# Patient Record
Sex: MALE | Race: WHITE | NOT HISPANIC OR LATINO | Employment: FULL TIME | URBAN - METROPOLITAN AREA
[De-identification: names, ages, dates, MRNs, and addresses within clinical notes are randomized per-mention and may not be internally consistent; named-entity substitution may affect disease eponyms.]

---

## 2020-09-08 ENCOUNTER — OFFICE VISIT (OUTPATIENT)
Dept: OBGYN CLINIC | Facility: CLINIC | Age: 30
End: 2020-09-08
Payer: OTHER MISCELLANEOUS

## 2020-09-08 VITALS — HEART RATE: 86 BPM | HEIGHT: 73 IN | DIASTOLIC BLOOD PRESSURE: 99 MMHG | SYSTOLIC BLOOD PRESSURE: 152 MMHG

## 2020-09-08 DIAGNOSIS — M76.71 PERONEAL TENDONITIS OF RIGHT LOWER EXTREMITY: ICD-10-CM

## 2020-09-08 DIAGNOSIS — T14.8XXA CONTUSION OF BONE: ICD-10-CM

## 2020-09-08 DIAGNOSIS — S93.491A SPRAIN OF ANTERIOR TALOFIBULAR LIGAMENT OF RIGHT ANKLE, INITIAL ENCOUNTER: Primary | ICD-10-CM

## 2020-09-08 PROCEDURE — 99203 OFFICE O/P NEW LOW 30 MIN: CPT | Performed by: ORTHOPAEDIC SURGERY

## 2020-09-08 NOTE — PATIENT INSTRUCTIONS
You have sprained your ankle  Over the next 4 weeks it is important you follow these instructions; Lateral Ankle Sprain Protocol - [facility]  Acute Phase: Days 1-3  Goals: Decrease pain and swelling, protect from further injury  · Pain and swelling management (RICE)  · Protection of injured ligaments (taping, splints, casting, walking boot etc )  · Gait-WBAT  Sub-Acute Phase: 2-4 days to 2 weeks  Goals: Decrease/eliminate pain, increase ROM, decrease swelling, increase strength  · Continue pain and swelling management  · Subtalar and talocrurcal joint mobilizations  · ROM with pain-free range: DF/PF/EV/IV AROM, calf stretching  · Increase weight bearing of affected extremity during gait  · Isometric strengthening  Rehabilitative Phase: 2-6 weeks  Goals: Regain ROM and strength, increase endurance and proprioception  · Continue joint mobilizations and stretching  · Progress to pain-free concentric and eccentric strengthening exercises (both open chain and closed chain)  · Proprioception exercises (balance board, BAPS board, single leg stance etc )  · Gait training-promote equal weight beraing and weaning of assistive devices  · Endurance activities (stationary biking, swimming, walking, etc )  Functional Phase: 6 weeks  Goals: Return to full activity and function  · Continue strengthening exercises  · Coordination and agility training-depends on patient's prior level of function, recreational activities, and goals         Treating your Sprained Ankle  Treating your sprained ankle properly may prevent chronic pain and instability  For a Grade I sprain, follow the R I C E  guidelines:    · Rest your ankle by not walking on it  Limit weight bearing  Use crutches if necessary; if there is no fracture you are safe to put some weight on the leg  An ankle brace often helps control swelling and adds stability while the ligaments are healing  · Ice it to keep down the swelling   Don't put ice directly on the skin (use a thin piece of cloth such as a pillow case between the ice bag and the skin) and don't ice more than 20 minutes at a time to avoid frost bite  · Compression can help control swelling as well as immobilize and support your injury  · Elevate the foot by reclining and propping it up above the waist or heart as needed     Swelling usually goes down with a few days  For a Grade II sprain, follow the R I C E  guidelines and allow more time for healing  A doctor may immobilize or splint your sprained ankle  A Grade III sprain puts you at risk for permanent ankle instability  Rarely, surgery may be needed to repair the damage, especially in competitive athletes  For severe ankle sprains, your doctor may also consider treating you with a short leg cast for two to three weeks or a walking boot  People who sprain their ankle repeatedly may also need surgical repair to tighten their ligaments  Rehabilitating your Sprained Ankle  Every ligament injury needs rehabilitation  Otherwise, your sprained ankle might not heal completely and you might re-injure it  All ankle sprains, from mild to severe, require three phases of recovery:    · Phase I includes resting, protecting and reducing swelling of your injured ankle  · Phase II includes restoring your ankle's flexibility, range of motion and strength  · Phase III includes gradually returning to straight-ahead activity and doing maintenance exercises, followed later by more cutting sports such as tennis, basketball or football    Once you can stand on your ankle again, your doctor will prescribe exercise routines to strengthen your muscles and ligaments and increase your flexibility, balance and coordination  Later, you may walk, jog and run figure eights with your ankle taped or in a supportive ankle brace  It's important to complete the rehabilitation program because it makes it less likely that you'll hurt the same ankle again   If you don't complete rehabilitation, you could suffer chronic pain, instability and arthritis in your ankle  If your ankle still hurts, it could mean that the sprained ligament has not healed right, or that some other injury also happened  To prevent future sprained ankles, pay attention to your body's warning signs to slow down when you feel pain or fatigue, and stay in shape with good muscle balance, flexibility and strength in your soft tissues  Ankle Sprain     What is an ankle sprain? An ankle sprain refers to tearing of the ligaments of the ankle  The most common ankle sprain occurs on the lateral or outside part of the ankle  This is an extremely common injury which affects many people during a wide variety of activities  It can happen in the setting of an ankle fracture (i e  when the bones of the ankle also break)  Most commonly, however, it occurs in isolation  What are the symptoms an ankle sprain? Patients report pain after having twisted an ankle  This usually occurs due to an inversion injury, which means the foot rolls underneath the ankle or leg  It commonly occurs during sports  Patients will complain of pain on the outside of their ankle and various degrees of swelling and bleeding under the skin (i e  bruising)  Technically, this bruising is referred to as ecchymosis  Depending on the severity of the sprain, a person may or may not be able to put weight on the foot     What are the risk factors for an ankle sprain? As noted above, these injuries occur when the ankle is twisted underneath the leg, called inversion  Risk factors are those activities, such as basketball and jumping sports, in which an athlete can come down on and turn the ankle or step on an opponent's foot      Some people are predisposed to ankle sprains  In people with a hindfoot varus, which means that the general nature or posture of the heels is slightly turned toward the inside, these injuries are more common   This is because it is easier to turn on the ankle      In those who have had a severe sprain in the past, it is also easier to turn the ankle and cause a new sprain  Therefore, one of the risk factors of spraining the ankle is having instability  Those who have weak muscles, especially those called the peroneals which run along the outside of the ankle, may be more predisposed    Anatomy    There are multiple ligaments in the ankle  Ligaments in general are those structures that connect bone-to-bone  Tendons, on the other hand, connect muscle-to-bone and allow those muscles to exert their force  In the case of an ankle sprain, there are several commonly sprained ligaments  The two most important are the following:            ?  ????       1  The ATFL or anterior talofibular ligament, which connects the talus to the fibula on the outside of the ankle       2  The CFL or calcaneal fibular ligament, which connects the fibula to the calcaneus below        3  Finally, there is a third ligament which is not as commonly torn  It runs more in the back of the ankle and is called the PTFL or posterior talofibular ligament  These must be differentiated from the so-called high ankle sprain ligaments, which are completely different and located higher up the leg  How is an ankle sprain diagnosed? Ankle sprains can be diagnosed fairly easily given that they are common injuries  The location of pain on the outside of the ankle with tenderness and swelling in a patient who has an ankle with inversion is very suggestive  In these patients, normal X-rays also suggest that the bone has not been broken and instead the ankle ligaments have been torn or sprained      It is very important, however, not to simply regard any injury as an ankle sprain because other injuries can occur as well  For example, the peroneal tendons mentioned above can be torn   There can also be fractures in other bones around the ankle including the fifth metatarsal and the anterior process of the calcaneus  In very severe cases, an MRI may be warranted to rule out other problems in the ankle such as damage to the cartilage  An MRI typically is not necessary to diagnose a sprain     What are treatment options? Surgery is not required in the vast majority of ankle sprains  Even in severe sprains, these ligaments will heal without surgery  The grade of the sprain will dictate treatment  Sprains are traditionally classified into several grades  Perhaps more important, however, is the patients ability to bear weight  Those that can bear weight even after the injury are likely to return very quickly to play  Those who cannot walk may need to be immobilized      In general, treatment in the first 48 to 72 hours consists of resting the ankle, icing 20 minutes every two to three hours, compressing with an ACE wrap, and elevating, which means positioning the leg and ankle so that the toes are above the level of patients nose  Those patients who cannot bear weight are better treated in a removable walking boot until they can comfortably bear weight      Physical therapy is a mainstay  Patients should learn to strengthen the muscles around the ankle, particularly the peroneals  An ankle brace can be used in an athlete until a therapist believes that the ankle is strong enough to return to play without it  Surgery is rarely indicated but may be needed in a patient who has cartilage damage or other related injuries  Ligaments are only repaired or strengthened in cases of chronic instability in which the ligaments have healed but not in a strong fashion  How long is recovery? Recovery depends on the severity of the injury  As noted above, for those minor injuries, people can return to their activities in sports within several days  For very severe sprains, it may take longer and up to several weeks   It should be noted that high ankle sprains take considerably longer to heal      Outcomes for ankle sprains are generally quite good  Most patients heal from an ankle sprain and are able to get back to their normal lives, sports and activities  Some people, however, who do not properly rehab their ankle and have a rather severe sprain may go on to have ankle instability  Chronic instability occurs in patients repeatedly spraining the ankle  Such repeated episodes can be dangerous because they can lead to damage within the ankle  These patients should be identified and considered for repair     Potential Complications    Surgery is rarely needed  As noted above, however, an improperly rehabbed ankle may end up having chronic instability  It is important to address this with either therapy or surgery before further damage occurs to the ankle  Frequently Asked Questions    What is a high ankle sprain and is that different from a regular ankle sprain? A high ankle sprain refers to tearing of the ligaments that connect the tibia to the fibula (this connection is also called the syndesmosis)  These are different and much less common than the standard lateral ankle sprains, meaning those that occur on the side of the ankle       Do ankle sprains ever need to be repaired acutely? Ankle sprains rarely, if ever, needed to be treated with surgery  The vast majority simply need to be treated with rest, ice, compression and elevation followed by physical therapy and temporary bracing       I have sprained my ankle many times  Should I be concerned? Yes  The more you sprain an ankle, the greater the chance that problems will develop  For example, turning the ankle can lead to damage to the cartilage inside the ankle joint  You should see your doctor if this is occurring  How to Stretch Your Ankle After A Sprain     You should perform the following stretches in stages once the initial pain and swelling have receded, usually within five to seven days   First is restoration of ankle range of motion, which should begin when you can tolerate weight bearing  Once ankle range of motion has been almost or completely restored, you must strengthen your ankle  Along with strengthening, you should work toward a feeling of stability and comfort in your ankle, which orthopaedic foot and ankle specialists call proprioception  Consider these home exercises when recuperating from an ankle sprain  Perform them twice per day       While seated, bring your ankle and foot all the way up as much as you can     Do this slowly, while feeling a stretch in your calf     Hold this for a count of 10     Repeat 10 times               From the seated starting position, bring your ankle down and in    [facility] this inverted position for a count of 10     Repeat 10 times                     Again from the starting position, bring your ankle up and out    [facility] this everted position for a count of 10     Repeat 10 times                    [place] the starting position, point your toes down and hold this position for a count of 10     Repeat 10 times                 This stretch should be done only when the pain in your ankle has significantly subsided     While standing on the edge of a stair, drop your ankles down and hold this stretched position   for a count of 10     Repeat 10 times                       Do this stretch only when the pain from your ankle sprain has significantly subsided     Stand 12 inches from a wall with your toes pointing toward the wall     Squat down and hold this position for a count of 10     Repeat 10 times                 How to Strengthen Your Ankle After a Sprain     Following an ankle sprain, strengthening exercises should be performed once you can bear weight comfortably and your range of motion is near full  There are several types of strengthening exercises  The easiest to begin with are isometric exercises that you do by pushing against a fixed object with your ankle    Once this has been mastered, you can progress to isotonic exercises, which involve using your ankle's range of motion against some form of resistance  The photos below show isotonic exercises performed with a resistance band, which you can get from your local therapist or a sporting [employer] store       Place your ankle in the "down and in" position against a fixed object such as a couch    Hold this position for a count of 10     Repeat 10 times                       Place your ankle in the "up and out" position against the same object     Hold this position for a count of 10     Repeat 10 times                         Push your ankle down against a fixed object and hold for a count of 10  Repeat 10 times          Push your ankle up against a fixed object and hold for a count of 10  Repeat 10 times           Using a resistance band around your forefoot, hold the ends of the band with your hand    and gently push your ankle down as far as you can and then back to the starting position     Repeat 10 times                       Tie the resistance bands around a fixed object and wrap the ends around your forefoot     Start with your foot pointing down and pull your ankle up as far as you can     Return to the starting position and cycle your ankle 10 times                       Tie the bands around an object to the outer side of your ankle     Start with the foot relaxed and then move your ankle down and in     Return to the relaxed position and repeat 10 times                                Tie the ends of the bands around an object to the inside of your ankle and hold your foot relaxed     Bring your foot up and out and then back to the resting position     Repeat 10 times                          Once you have regained the motion and strength in your ankle, you are ready for sporting activities such as gentle jogging and biking  After you feel your ankle strength is approximately 80% of your other side, then you can begin cutting or twisting sports  Proprioceptive Exercises for Balance, Coordination and Agility      Stand with your affected leg on a pillow     Hold this position for a count of 10     Repeat 10 times                            Stand on your affected leg with the resistance band applied to your unaffected leg     Bring your unaffected leg forward and then back to the starting position     Repeat 10 times     Start slowly and progress to a faster speed for a more difficult workout                       Again, start slowly and increase your speed at your own pace, moving the unaffected leg   forward and then back to the starting position                                   For a more advanced exercise, swing your unaffected leg behind you and then back

## 2020-09-08 NOTE — LETTER
September 8, 2020     Referral [address] [place] [address]    Patient: Angela Morse   YOB: 1990   Date of Visit: 9/8/2020     Dear Dr Darryle Kicks      Thank you for referring Angela Morse to me for evaluation  Below are the relevant portions of my assessment and plan of care  If you have questions, please do not hesitate to call me  I look forward to following Sina Armas along with you           Sincerely,        Manuel Sinclair MD        CC: Per Leon MD    Progress Notes:

## 2020-09-08 NOTE — PROGRESS NOTES
NICO Finch  Attending, Orthopaedic Surgery  [facility]      Assessment:     Encounter Diagnoses   Name Primary?  Sprain of anterior talofibular ligament of right ankle, initial encounter Yes    Contusion of bone     Peroneal tendonitis of right lower extremity               Plan:       Peewee Carolina has been treated for a right ankle sprain for 11 weeks with HEP, high tide cam walker boot, and work restrictions  His previous imaging and reports Xray and MRI show no fracture or dislocation, no tendon tears, and no OCD lesions  He presents in the office today with continuous right ankle pain  Most of his pain is at his lateral ligaments and peroneal tendons  He is to discontinue cam walker boot immediately and wear a stiff soled sneaker as advised in the office, start physical therapy immediately  These two things have been shown in multiple level 1 studies to minimize the duration of symptoms (weightbearing as tolerated in a sneaker and PT as soon as possible ) He has no done either of these to this point  The patient has sustained a sprain of the right ATFL and CFL and peroneal tendinitis  We will begin physical therapy now- Order placed  Instructions for Home stretching program provided in AVSS  Instructions given for rest, ice 20mins/hr, elevation, and Ace wrap given for compression  Work/School restrictions given      Follow up will be in 7 weeks  Subjective:    Chief Complaint:    Chief Complaint   Patient presents with    Right Ankle - Pain        Missy Burgos is a 35 y o  male referred by workman's compensation Dr Leonard Sawant for evaluation and treatment of an injury to the right ankle  This is evaluated as a workers compensation injury  The injury occurred 11 weeks ago, and occurred 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[facility] [employer] and stepped into a divot in the road  The patient states the ankle rolled inward at the time of injury    He did hear or sense a pop or snap at the time of the injury  The patient notes pain and severe swelling of the ankle since the injury  He has treated the ankle with ice, ace wrap, Elevation, Rest, CAM boot, Xray  Pain is localized to the lateral, anteromedial  malleolar area  He has sprained this ankle in the past     Pain/symptom location: right ankle  Pain/symptom quality: aching and sharp  Pain/symptom severity: severe  Pain/symptom timing:  Worse during the day when active  Pain/symptom conext:  Worse with activites and work  Pain/symptom modifying factors:  Rest makes better, activities make worse  Pain/symptom associated signs/symptoms: none    Outside reports reviewed: x-ray reports and images, MRI report and images  Foot and Ankle Surgical History:   see below  Past Medical, Surgical and Social History:  Past Medical History:  has a past medical history of Hypertension  Problem List: does not have any pertinent problems on file  Past Surgical History:  has a past surgical history that includes Hernia repair  Family History: family history is not on file  Social History:  reports that he has been smoking  He has never used smokeless tobacco  No history on file for alcohol and drug  Current Medications: currently has no medications in their medication list   Allergies: is allergic to sulfa antibiotics       Review of Systems:  General- denies fever/chills  HEENT- denies hearing loss or sore throat  Eyes- denies eye pain or visual disturbances, denies red eyes  Respiratory- denies cough or SOB  Cardio- denies chest pain or palpitations  GI- denies abdominal pain  Endocrine- denies urinary frequency  Urinary- denies pain with urination  Musculoskeletal- Negative except noted above  Skin- denies rashes or wounds  Neurological- denies dizziness or headache  Psychiatric- denies anxiety or difficulty concentrating    Objective:        /99 (BP Location: Right arm, Patient Position: Sitting, Cuff Size: Adult)   Pulse 86   Ht 6' 1" (1 854 m)   General/Constitutional: No apparent distress: well-nourished and well developed  Eyes: normal ocular motion  Lymphatic: No appreciable lymphadenopathy  Respiratory: Non-labored breathing  Vascular: No edema, swelling or tenderness, except as noted in detailed exam   Integumentary: No impressive skin lesions present, except as noted in detailed exam   Neuro: No ataxia or abnormal movements  Psych: Normal mood and affect, oriented to person, place and time  MSK: normal other than stated in HPI and exam      Gait:  Antalgic (in a high tide cam walker boot) The patient can bear weight on the injured extremity  Right Ankle  Proximal Fibula:   no tenderness noted   Edema: Moderate swelling circumferentially in the ankle   Ecchymosis:   Moderate in lateral ankle   Crepitus  None   Active ROM:  50% of normal    Passive ROM:   75% of normal    Palpation:  Significant tenderness of the anterolateral ligaments, peroneal tendon   Stability :   No joint laxity  Drawer sign equal to unaffected ankle  Anterior drawer:  Negative , pain with anterior drawer  Syndosmosis:   syndesmotic ligament IS NOT tender   Sensation:     Intact in all distributions   Pulses:  normal DP and PT pulses       Imaging  X-ray of the ankle/foot: 3 views of the ankle reveal a stable mortise joint, moderate soft tissue swelling, and no evidence of fracture  Reviewed by me personally  MRI Right Ankle: no fracture or dislocation, no tendon tear noted  No evidence of an OCD lesion  I personally performed this service        Scribe Attestation    I,:   Gwendolyn Cornelius am acting as a scribe while in the presence of the attending physician :        I,:   Aurelia Shearer MD personally performed the services described in this documentation    as scribed in my presence :

## 2020-09-08 NOTE — LETTER
September 8, 2020     Patient: Katelyn Velasquez   YOB: 1990   Date of Visit: 9/8/2020       To Whom it May Concern:    Katelyn Velasquez is under my professional care  He was seen in my office on 9/8/2020  He is to be on sedentary desk duty only until his next office visit x 7 weeks  If you have any questions or concerns, please don't hesitate to call           Sincerely,          Albert Koo MD        CC: Katelyn Eva

## 2020-09-09 ENCOUNTER — TELEPHONE (OUTPATIENT)
Dept: OBGYN CLINIC | Facility: HOSPITAL | Age: 30
End: 2020-09-09

## 2020-09-09 NOTE — TELEPHONE ENCOUNTER
Parker Bro from [facility] is calling because he does not have the patient's phone number  Parker Bro is also asking that I fax the patient's physical therapy script to him  Information was faxed to [phone]

## 2020-09-17 NOTE — TELEPHONE ENCOUNTER
Hannah at [address] [employer] is calling in wanting to get the PT script faxed over to [phone] they did not receive this

## 2020-11-13 ENCOUNTER — OFFICE VISIT (OUTPATIENT)
Dept: OBGYN CLINIC | Facility: CLINIC | Age: 30
End: 2020-11-13
Payer: OTHER MISCELLANEOUS

## 2020-11-13 VITALS
WEIGHT: 282 LBS | BODY MASS INDEX: 36.19 KG/M2 | HEIGHT: 74 IN | HEART RATE: 85 BPM | SYSTOLIC BLOOD PRESSURE: 148 MMHG | DIASTOLIC BLOOD PRESSURE: 90 MMHG

## 2020-11-13 DIAGNOSIS — S93.491A SPRAIN OF ANTERIOR TALOFIBULAR LIGAMENT OF RIGHT ANKLE, INITIAL ENCOUNTER: Primary | ICD-10-CM

## 2020-11-13 DIAGNOSIS — M76.71 PERONEAL TENDONITIS OF RIGHT LOWER EXTREMITY: ICD-10-CM

## 2020-11-13 PROCEDURE — 99213 OFFICE O/P EST LOW 20 MIN: CPT | Performed by: ORTHOPAEDIC SURGERY

## 2021-06-26 ENCOUNTER — APPOINTMENT (EMERGENCY)
Dept: ULTRASOUND IMAGING | Facility: HOSPITAL | Age: 31
End: 2021-06-26

## 2021-06-26 ENCOUNTER — APPOINTMENT (EMERGENCY)
Dept: CT IMAGING | Facility: HOSPITAL | Age: 31
End: 2021-06-26

## 2021-06-26 ENCOUNTER — APPOINTMENT (EMERGENCY)
Dept: RADIOLOGY | Facility: HOSPITAL | Age: 31
End: 2021-06-26

## 2021-06-26 ENCOUNTER — HOSPITAL ENCOUNTER (EMERGENCY)
Facility: HOSPITAL | Age: 31
Discharge: HOME | End: 2021-06-26
Attending: EMERGENCY MEDICINE

## 2021-06-26 VITALS
DIASTOLIC BLOOD PRESSURE: 68 MMHG | HEIGHT: 74 IN | BODY MASS INDEX: 36.5 KG/M2 | SYSTOLIC BLOOD PRESSURE: 121 MMHG | TEMPERATURE: 96.9 F | RESPIRATION RATE: 18 BRPM | WEIGHT: 284.39 LBS | HEART RATE: 85 BPM | OXYGEN SATURATION: 98 %

## 2021-06-26 DIAGNOSIS — B96.89 ACUTE BACTERIAL BRONCHITIS: ICD-10-CM

## 2021-06-26 DIAGNOSIS — J20.8 ACUTE BACTERIAL BRONCHITIS: ICD-10-CM

## 2021-06-26 DIAGNOSIS — R04.2 HEMOPTYSIS: Primary | ICD-10-CM

## 2021-06-26 DIAGNOSIS — R07.89 ATYPICAL CHEST PAIN: ICD-10-CM

## 2021-06-26 LAB
ALBUMIN SERPL BCP-MCNC: 4 G/DL (ref 3.5–5)
ALP SERPL-CCNC: 97 U/L (ref 46–116)
ALT SERPL W P-5'-P-CCNC: 114 U/L (ref 12–78)
ANION GAP SERPL CALCULATED.3IONS-SCNC: 8 MMOL/L (ref 4–13)
AST SERPL W P-5'-P-CCNC: 49 U/L (ref 5–45)
ATRIAL RATE: 88 BPM
BASOPHILS # BLD AUTO: 0.07 THOUSANDS/ΜL (ref 0–0.1)
BASOPHILS NFR BLD AUTO: 1 % (ref 0–1)
BILIRUB SERPL-MCNC: 0.3 MG/DL (ref 0.2–1)
BUN SERPL-MCNC: 12 MG/DL (ref 5–25)
CALCIUM SERPL-MCNC: 8.8 MG/DL (ref 8.3–10.1)
CHLORIDE SERPL-SCNC: 103 MMOL/L (ref 100–108)
CO2 SERPL-SCNC: 28 MMOL/L (ref 21–32)
CREAT SERPL-MCNC: 1.09 MG/DL (ref 0.6–1.3)
D DIMER PPP FEU-MCNC: 0.31 UG/ML FEU
EOSINOPHIL # BLD AUTO: 0.26 THOUSAND/ΜL (ref 0–0.61)
EOSINOPHIL NFR BLD AUTO: 3 % (ref 0–6)
ERYTHROCYTE [DISTWIDTH] IN BLOOD BY AUTOMATED COUNT: 11.9 % (ref 11.6–15.1)
GFR SERPL CREATININE-BSD FRML MDRD: 91 ML/MIN/1.73SQ M
GLUCOSE SERPL-MCNC: 113 MG/DL (ref 65–140)
HCT VFR BLD AUTO: 47.6 % (ref 36.5–49.3)
HGB BLD-MCNC: 15.9 G/DL (ref 12–17)
IMM GRANULOCYTES # BLD AUTO: 0.08 THOUSAND/UL (ref 0–0.2)
IMM GRANULOCYTES NFR BLD AUTO: 1 % (ref 0–2)
LYMPHOCYTES # BLD AUTO: 3.23 THOUSANDS/ΜL (ref 0.6–4.47)
LYMPHOCYTES NFR BLD AUTO: 31 % (ref 14–44)
MCH RBC QN AUTO: 31 PG (ref 26.8–34.3)
MCHC RBC AUTO-ENTMCNC: 33.4 G/DL (ref 31.4–37.4)
MCV RBC AUTO: 93 FL (ref 82–98)
MONOCYTES # BLD AUTO: 0.87 THOUSAND/ΜL (ref 0.17–1.22)
MONOCYTES NFR BLD AUTO: 8 % (ref 4–12)
NEUTROPHILS # BLD AUTO: 6.09 THOUSANDS/ΜL (ref 1.85–7.62)
NEUTS SEG NFR BLD AUTO: 56 % (ref 43–75)
NRBC BLD AUTO-RTO: 0 /100 WBCS
NT-PROBNP SERPL-MCNC: 7 PG/ML
P AXIS: 70 DEGREES
PLATELET # BLD AUTO: 278 THOUSANDS/UL (ref 149–390)
PMV BLD AUTO: 9.7 FL (ref 8.9–12.7)
POTASSIUM SERPL-SCNC: 4.4 MMOL/L (ref 3.5–5.3)
PR INTERVAL: 148 MS
PROT SERPL-MCNC: 7.6 G/DL (ref 6.4–8.2)
QRS AXIS: 58 DEGREES
QRSD INTERVAL: 96 MS
QT INTERVAL: 366 MS
QTC INTERVAL: 442 MS
RBC # BLD AUTO: 5.13 MILLION/UL (ref 3.88–5.62)
SARS-COV-2 RNA RESP QL NAA+PROBE: NEGATIVE
SODIUM SERPL-SCNC: 139 MMOL/L (ref 136–145)
T WAVE AXIS: 66 DEGREES
TROPONIN I SERPL-MCNC: <0.02 NG/ML
VENTRICULAR RATE: 88 BPM
WBC # BLD AUTO: 10.6 THOUSAND/UL (ref 4.31–10.16)

## 2021-06-26 PROCEDURE — 96375 TX/PRO/DX INJ NEW DRUG ADDON: CPT

## 2021-06-26 PROCEDURE — 80053 COMPREHEN METABOLIC PANEL: CPT | Performed by: EMERGENCY MEDICINE

## 2021-06-26 PROCEDURE — 71260 CT THORAX DX C+: CPT

## 2021-06-26 PROCEDURE — U0003 INFECTIOUS AGENT DETECTION BY NUCLEIC ACID (DNA OR RNA); SEVERE ACUTE RESPIRATORY SYNDROME CORONAVIRUS 2 (SARS-COV-2) (CORONAVIRUS DISEASE [COVID-19]), AMPLIFIED PROBE TECHNIQUE, MAKING USE OF HIGH THROUGHPUT TECHNOLOGIES AS DESCRIBED BY CMS-2020-01-R: HCPCS | Performed by: EMERGENCY MEDICINE

## 2021-06-26 PROCEDURE — 93005 ELECTROCARDIOGRAM TRACING: CPT

## 2021-06-26 PROCEDURE — 71045 X-RAY EXAM CHEST 1 VIEW: CPT

## 2021-06-26 PROCEDURE — 85025 COMPLETE CBC W/AUTO DIFF WBC: CPT | Performed by: EMERGENCY MEDICINE

## 2021-06-26 PROCEDURE — 99285 EMERGENCY DEPT VISIT HI MDM: CPT

## 2021-06-26 PROCEDURE — 94640 AIRWAY INHALATION TREATMENT: CPT

## 2021-06-26 PROCEDURE — 84484 ASSAY OF TROPONIN QUANT: CPT | Performed by: EMERGENCY MEDICINE

## 2021-06-26 PROCEDURE — 96365 THER/PROPH/DIAG IV INF INIT: CPT

## 2021-06-26 PROCEDURE — 99284 EMERGENCY DEPT VISIT MOD MDM: CPT | Performed by: EMERGENCY MEDICINE

## 2021-06-26 PROCEDURE — U0005 INFEC AGEN DETEC AMPLI PROBE: HCPCS | Performed by: EMERGENCY MEDICINE

## 2021-06-26 PROCEDURE — 93010 ELECTROCARDIOGRAM REPORT: CPT | Performed by: INTERNAL MEDICINE

## 2021-06-26 PROCEDURE — 76705 ECHO EXAM OF ABDOMEN: CPT

## 2021-06-26 PROCEDURE — 36415 COLL VENOUS BLD VENIPUNCTURE: CPT | Performed by: EMERGENCY MEDICINE

## 2021-06-26 PROCEDURE — 83880 ASSAY OF NATRIURETIC PEPTIDE: CPT | Performed by: EMERGENCY MEDICINE

## 2021-06-26 PROCEDURE — 85379 FIBRIN DEGRADATION QUANT: CPT | Performed by: EMERGENCY MEDICINE

## 2021-06-26 PROCEDURE — 96361 HYDRATE IV INFUSION ADD-ON: CPT

## 2021-06-26 RX ORDER — ASPIRIN 81 MG/1
324 TABLET, CHEWABLE ORAL ONCE
Status: COMPLETED | OUTPATIENT
Start: 2021-06-26 | End: 2021-06-26

## 2021-06-26 RX ORDER — ALBUTEROL SULFATE 90 UG/1
2 AEROSOL, METERED RESPIRATORY (INHALATION) EVERY 4 HOURS PRN
Qty: 8 G | Refills: 0 | Status: SHIPPED | OUTPATIENT
Start: 2021-06-26

## 2021-06-26 RX ORDER — METHYLPREDNISOLONE SODIUM SUCCINATE 125 MG/2ML
125 INJECTION, POWDER, LYOPHILIZED, FOR SOLUTION INTRAMUSCULAR; INTRAVENOUS ONCE
Status: COMPLETED | OUTPATIENT
Start: 2021-06-26 | End: 2021-06-26

## 2021-06-26 RX ORDER — IPRATROPIUM BROMIDE AND ALBUTEROL SULFATE 2.5; .5 MG/3ML; MG/3ML
3 SOLUTION RESPIRATORY (INHALATION)
Status: DISCONTINUED | OUTPATIENT
Start: 2021-06-26 | End: 2021-06-26 | Stop reason: HOSPADM

## 2021-06-26 RX ORDER — AMOXICILLIN AND CLAVULANATE POTASSIUM 875; 125 MG/1; MG/1
1 TABLET, FILM COATED ORAL EVERY 12 HOURS
Qty: 14 TABLET | Refills: 0 | Status: SHIPPED | OUTPATIENT
Start: 2021-06-26 | End: 2021-07-03

## 2021-06-26 RX ORDER — CEFTRIAXONE 1 G/50ML
1000 INJECTION, SOLUTION INTRAVENOUS ONCE
Status: COMPLETED | OUTPATIENT
Start: 2021-06-26 | End: 2021-06-26

## 2021-06-26 RX ORDER — PREDNISONE 20 MG/1
40 TABLET ORAL DAILY
Qty: 10 TABLET | Refills: 0 | Status: SHIPPED | OUTPATIENT
Start: 2021-06-26 | End: 2021-07-01

## 2021-06-26 RX ADMIN — IPRATROPIUM BROMIDE AND ALBUTEROL SULFATE 3 ML: 2.5; .5 SOLUTION RESPIRATORY (INHALATION) at 10:36

## 2021-06-26 RX ADMIN — SODIUM CHLORIDE 1000 ML: 0.9 INJECTION, SOLUTION INTRAVENOUS at 10:36

## 2021-06-26 RX ADMIN — IOHEXOL 85 ML: 350 INJECTION, SOLUTION INTRAVENOUS at 12:34

## 2021-06-26 RX ADMIN — METHYLPREDNISOLONE SODIUM SUCCINATE 125 MG: 125 INJECTION, POWDER, FOR SOLUTION INTRAMUSCULAR; INTRAVENOUS at 10:46

## 2021-06-26 RX ADMIN — ASPIRIN 324 MG: 81 TABLET, CHEWABLE ORAL at 10:36

## 2021-06-26 RX ADMIN — CEFTRIAXONE 1000 MG: 1 INJECTION, SOLUTION INTRAVENOUS at 14:55

## 2021-06-26 RX ADMIN — IPRATROPIUM BROMIDE AND ALBUTEROL SULFATE 3 ML: 2.5; .5 SOLUTION RESPIRATORY (INHALATION) at 14:58

## 2021-06-26 NOTE — ED NOTES
Patient transported to [address] [place] [address] [address] [place], RN  06/26/21 [id number]

## 2021-06-26 NOTE — DISCHARGE INSTRUCTIONS
Have made a referral for you  Please call the number listed for info link to schedule appointment for a PCP  The most common cause for hemoptysis, which is bloody sputum, in your age is bronchitis  Her findings are consistent with acute bronchitis  Given given 1 dose of antibiotics here in the emergency department  Please  the prescriptions as prescribed for you and take the antibiotic as ordered until it is finished  Also take the prednisone for the next 5 days is ordered  I have also ordered an albuterol inhaler as you had wheezing  For the next 72 hours, take 2 puffs from an albuterol inhaler every 4-6 hours around the clock and then use it every 4-6 hours as needed for wheezing or cough    If you do not improve please come back to the ED  Please follow-up with the PCP      Your cardiac workup was negative for an acute cardiac event

## 2021-06-26 NOTE — ED PROVIDER NOTES
History  Chief Complaint   Patient presents with    Chest Pain     started 3 days ago with chest pain and today coughing up blood     This is a 81-year-old male who ambulates emergency department with reporting intermittent midsternal chest pain for the past 3 days  He states he developed a cough last night into this morning and this morning he had some hemoptysis  He states approximately 1 month ago he also had hemoptysis while he was in [place] however the weighted the ER was over 7 hours so he did not stay  He does have a history of smoking a pack of cigarettes per day  He has had some chills but no fever  He has not had the COVID vaccine or the COVID virus itself  He denies history of cardiac disease  Denies family history of early cardiac disease  His grandmother did pass away from lung cancer  His mother had COPD and emphysema  He denies radiation of the pain  States that the pain is just a dull aching pain  No aggravating alleviating factors indicated  Denies any known coagulopathies  He is not on any anticoagulants          None       Past Medical History:   Diagnosis Date    Hypertension        Past Surgical History:   Procedure Laterality Date    HERNIA REPAIR         History reviewed  No pertinent family history  I have reviewed and agree with the history as documented  E-Cigarette/Vaping    E-Cigarette Use Never User      E-Cigarette/Vaping Substances     Social History     Tobacco Use    Smoking status: Current Every Day Smoker    Smokeless tobacco: Never Used   Vaping Use    Vaping Use: Never used   Substance Use Topics    Alcohol use: Never    Drug use: Never       Review of Systems   Constitutional: Positive for chills  Negative for activity change, appetite change, diaphoresis, fatigue, fever and unexpected weight change     HENT: Negative for congestion, ear discharge, ear pain, mouth sores, sinus pressure, sinus pain, sneezing, sore throat, trouble swallowing and voice change  Eyes: Negative for photophobia, pain, discharge, redness, itching and visual disturbance  Respiratory: Positive for cough  Negative for chest tightness and shortness of breath  Hemoptysis   Cardiovascular: Positive for chest pain  Negative for palpitations and leg swelling  Gastrointestinal: Negative for abdominal pain, constipation, nausea and vomiting  Endocrine: Negative for cold intolerance, heat intolerance, polydipsia, polyphagia and polyuria  Genitourinary: Negative for decreased urine volume, difficulty urinating, dysuria, frequency, hematuria and urgency  Musculoskeletal: Negative for arthralgias, back pain, gait problem, joint swelling, myalgias, neck pain and neck stiffness  Skin: Negative for color change and rash  Allergic/Immunologic: Negative for immunocompromised state  Neurological: Negative for dizziness, tremors, seizures, syncope, speech difficulty, weakness, light-headedness, numbness and headaches  Hematological: Does not bruise/bleed easily  Psychiatric/Behavioral: Negative for behavioral problems and suicidal ideas  Physical Exam  Physical Exam  Vitals and nursing note reviewed  Constitutional:       General: He is not in acute distress  Appearance: Normal appearance  He is well-developed and normal weight  He is not ill-appearing, toxic-appearing or diaphoretic  HENT:      Head: Normocephalic and atraumatic  Right Ear: Tympanic membrane, ear canal and external ear normal  There is no impacted cerumen  Left Ear: Tympanic membrane, ear canal and external ear normal  There is no impacted cerumen  Nose: No congestion or rhinorrhea  Mouth/Throat:      Mouth: Mucous membranes are moist       Pharynx: Oropharynx is clear  No oropharyngeal exudate or posterior oropharyngeal erythema  Eyes:      General: No scleral icterus  Right eye: No discharge  Left eye: No discharge        Extraocular Movements: Extraocular movements intact  Conjunctiva/sclera: Conjunctivae normal       Pupils: Pupils are equal, round, and reactive to light  Neck:      Vascular: No JVD  Trachea: No tracheal deviation  Cardiovascular:      Rate and Rhythm: Normal rate and regular rhythm  Pulses:           Carotid pulses are 2+ on the right side and 2+ on the left side  Radial pulses are 2+ on the right side and 2+ on the left side  Dorsalis pedis pulses are 2+ on the right side and 2+ on the left side  Posterior tibial pulses are 2+ on the right side and 2+ on the left side  Heart sounds: Normal heart sounds  No murmur heard  Pulmonary:      Effort: Pulmonary effort is normal  No respiratory distress  Breath sounds: Examination of the right-upper field reveals decreased breath sounds and wheezing  Examination of the left-upper field reveals decreased breath sounds and wheezing  Examination of the right-middle field reveals decreased breath sounds and wheezing  Examination of the left-middle field reveals decreased breath sounds and wheezing  Examination of the right-lower field reveals decreased breath sounds and wheezing  Examination of the left-lower field reveals decreased breath sounds and wheezing  Decreased breath sounds and wheezing present  No rales  Chest:      Chest wall: No tenderness  Abdominal:      General: Bowel sounds are normal       Palpations: Abdomen is soft  There is no mass  Tenderness: There is no abdominal tenderness  There is no right CVA tenderness, left CVA tenderness or guarding  Hernia: No hernia is present  Musculoskeletal:         General: No swelling, tenderness, deformity or signs of injury  Normal range of motion  Cervical back: Normal range of motion and neck supple  No rigidity  No muscular tenderness  Right lower leg: No edema  Left lower leg: No edema  Lymphadenopathy:      Cervical: No cervical adenopathy     Skin: General: Skin is warm and dry  Capillary Refill: Capillary refill takes less than 2 seconds  Findings: No bruising, erythema or rash  Neurological:      General: No focal deficit present  Mental Status: He is alert and oriented to person, place, and time  Mental status is at baseline  Cranial Nerves: No cranial nerve deficit  Sensory: No sensory deficit  Motor: No weakness or abnormal muscle tone  Coordination: Coordination normal       Gait: Gait normal       Deep Tendon Reflexes: Reflexes normal    Psychiatric:         Mood and Affect: Mood normal          Behavior: Behavior normal          Thought Content:  Thought content normal          Judgment: Judgment normal          Vital Signs  ED Triage Vitals   Temperature Pulse Respirations Blood Pressure SpO2   06/26/21 1027 06/26/21 1027 06/26/21 1027 06/26/21 1027 06/26/21 1027   (!) 96 9 °F (36 1 °C) 82 18 (!) 172/99 97 %      Temp Source Heart Rate Source Patient Position - Orthostatic VS BP Location FiO2 (%)   06/26/21 1027 06/26/21 1027 06/26/21 1027 06/26/21 1027 --   Temporal Monitor Sitting Right arm       Pain Score       06/26/21 1026       6           Vitals:    06/26/21 1600 06/26/21 1645 06/26/21 1700 06/26/21 1728   BP: 121/58 158/72 122/57 121/68   Pulse: 85 82 83 85   Patient Position - Orthostatic VS: Lying Sitting Sitting Sitting         Visual Acuity      ED Medications  Medications   ipratropium-albuterol (DUO-NEB) 0 5-2 5 mg/3 mL inhalation solution 3 mL (3 mL Nebulization Given 6/26/21 1458)   aspirin chewable tablet 324 mg (324 mg Oral Given 6/26/21 1036)   sodium chloride 0 9 % bolus 1,000 mL (0 mL Intravenous Stopped 6/26/21 1148)   methylPREDNISolone sodium succinate (Solu-MEDROL) injection 125 mg (125 mg Intravenous Given 6/26/21 1046)   iohexol (OMNIPAQUE) 350 MG/ML injection (SINGLE-DOSE) 85 mL (85 mL Intravenous Given 6/26/21 1234)   cefTRIAXone (ROCEPHIN) IVPB (premix in dextrose) 1,000 mg 50 mL (0 mg Intravenous Stopped 6/26/21 1525)       Diagnostic Studies  Results Reviewed     Procedure Component Value Units Date/Time    Novel Coronavirus (Covid-19),PCR [facility] - 2 Hour Stat [[id number]]  (Normal) Collected: 06/26/21 1036    Lab Status: Final result Specimen: Nasopharyngeal Swab Updated: 06/26/21 1159     SARS-CoV-2 Negative    Narrative: The specimen collection materials, transport medium, and/or testing methodology utilized in the production of these test results have been proven to be reliable in a limited validation with an abbreviated program under the Emergency Utilization Authorization provided by the FDA  Testing reported as "Presumptive positive" will be confirmed with secondary testing to ensure result accuracy  Clinical caution and judgement should be used with the interpretation of these results with consideration of the clinical impression and other laboratory testing  Testing reported as "Positive" or "Negative" has been proven to be accurate according to standard laboratory validation requirements  All testing is performed with control materials showing appropriate reactivity at standard intervals        NT-BNP PRO [[id number]]  (Normal) Collected: 06/26/21 1036    Lab Status: Final result Specimen: Blood from Arm, Left Updated: 06/26/21 1112     NT-proBNP 7 pg/mL     Troponin I [[id number]]  (Normal) Collected: 06/26/21 1036    Lab Status: Final result Specimen: Blood from Arm, Left Updated: 06/26/21 1105     Troponin I <0 02 ng/mL     Comprehensive metabolic panel [[id number]]  (Abnormal) Collected: 06/26/21 1036    Lab Status: Final result Specimen: Blood from Arm, Left Updated: 06/26/21 1103     Sodium 139 mmol/L      Potassium 4 4 mmol/L      Chloride 103 mmol/L      CO2 28 mmol/L      ANION GAP 8 mmol/L      BUN 12 mg/dL      Creatinine 1 09 mg/dL      Glucose 113 mg/dL      Calcium 8 8 mg/dL      AST 49 U/L       U/L      Alkaline Phosphatase 97 U/L      Total Protein 7 6 g/dL Albumin 4 0 g/dL      Total Bilirubin 0 30 mg/dL      eGFR 91 ml/min/1 73sq m     Narrative:      National Kidney Disease Foundation guidelines for Chronic Kidney Disease (CKD):     Stage 1 with normal or high GFR (GFR > 90 mL/min/1 73 square meters)    Stage 2 Mild CKD (GFR = 60-89 mL/min/1 73 square meters)    Stage 3A Moderate CKD (GFR = 45-59 mL/min/1 73 square meters)    Stage 3B Moderate CKD (GFR = 30-44 mL/min/1 73 square meters)    Stage 4 Severe CKD (GFR = 15-29 mL/min/1 73 square meters)    Stage 5 End Stage CKD (GFR <15 mL/min/1 73 square meters)  Note: GFR calculation is accurate only with a steady state creatinine    D-dimer, quantitative [[id number]]  (Normal) Collected: 06/26/21 1036    Lab Status: Final result Specimen: Blood from Arm, Left Updated: 06/26/21 1058     D-Dimer, Quant 0 31 ug/ml FEU     CBC and differential [[id number]]  (Abnormal) Collected: 06/26/21 1036    Lab Status: Final result Specimen: Blood from Arm, Left Updated: 06/26/21 1045     WBC 10 60 Thousand/uL      RBC 5 13 Million/uL      Hemoglobin 15 9 g/dL      Hematocrit 47 6 %      MCV 93 fL      MCH 31 0 pg      MCHC 33 4 g/dL      RDW 11 9 %      MPV 9 7 fL      Platelets 088 Thousands/uL      nRBC 0 /100 WBCs      Neutrophils Relative 56 %      Immat GRANS % 1 %      Lymphocytes Relative 31 %      Monocytes Relative 8 %      Eosinophils Relative 3 %      Basophils Relative 1 %      Neutrophils Absolute 6 09 Thousands/µL      Immature Grans Absolute 0 08 Thousand/uL      Lymphocytes Absolute 3 23 Thousands/µL      Monocytes Absolute 0 87 Thousand/µL      Eosinophils Absolute 0 26 Thousand/µL      Basophils Absolute 0 07 Thousands/µL                  US gallbladder   Final Result by Ruth Ann Cadena MD (06/26 1162)      Enlarged, fatty liver  No cholelithiasis or biliary ductal obstruction        Workstation performed: [id number]         CT chest with contrast   Final Result by Lyric Tobias MD (06/26 4205) Normal chest CT  Workstation performed: [id number]         X-ray chest 1 view portable    (Results Pending)              Procedures  Procedures         ED Course  ED Course as of Jun 26 1733   Sat Jun 26, 2021   1205 D-Dimer, Quant: 0 31   1731 EKG was negative for acute findings  Chest x-ray was negative for acute findings  Due to hemoptysis I did have a CT scan of the chest with IV contrast to rule out a mass  The CT was negative  Patient's findings are consistent with acute bronchitis  I did explain to the patient this is most common reason for hemoptysis in a patient his age  Patient does also smoke  We discussed about stopping smoking would be in his best interest     COVID was negative  Troponins negative  D-dimer was 0 31 therefore did not do a CTA as this ruled out a pulmonary embolus  Slight leukocytosis at 10 6  Hemoglobin hematocrit stable at 15 9 47 6 with a stable platelet count of 616  This concern for CHF and BNP was normal at 7  Electrolytes normal   Elevated AST at 49  total bili normal 0 3  Due to the elevated AST ALT and patient's pain being located in the sternal area I had an ultrasound done of the gallbladder which was negative for acute findings  Discussed all the findings with patient  Also referred him to PCP  On repeat exam after patient's DuoNebs steroids patient's wheezing was improved  Discussed with patient the need to use the albuterol inhaler every 4 hours around the clock for the next 72 hours then as needed basis  Patient was reexamined at this time and informed of laboratory and/or imaging results and was found to be stable for discharge  Return to emergency department criteria was reviewed with the patient who verbalized understanding and was agreeable to discharge and the treatment plan at this time  1733 Portions of the record may have been created with voice recognition software   Occasional wrong word or "sound a like" substitutions may have occurred due to the inherent limitations of voice recognition software  Read the chart carefully and recognize, using context, where substitutions have occurred  HEART Risk Score      Most Recent Value   Heart Score Risk Calculator   History  0 Filed at: 06/26/2021 1206   ECG  0 Filed at: 06/26/2021 1206   Age  0 Filed at: 06/26/2021 1206   Risk Factors  1 Filed at: 06/26/2021 1206   Troponin  0 Filed at: 06/26/2021 1206   HEART Score  1 Filed at: 06/26/2021 1206                      SBIRT 22yo+      Most Recent Value   SBIRT (25 yo +)   In order to provide better care to our patients, we are screening all of our patients for alcohol and drug use  Would it be okay to ask you these screening questions? Yes Filed at: 06/26/2021 1031   Initial Alcohol Screen: US AUDIT-C    1  How often do you have a drink containing alcohol?  0 Filed at: 06/26/2021 1031   2  How many drinks containing alcohol do you have on a typical day you are drinking? 0 Filed at: 06/26/2021 1031   3a  Male UNDER 65: How often do you have five or more drinks on one occasion? 0 Filed at: 06/26/2021 1031   3b  FEMALE Any Age, or MALE 65+: How often do you have 4 or more drinks on one occassion? 0 Filed at: 06/26/2021 1031   Audit-C Score  0 Filed at: 06/26/2021 1031   KENNY: How many times in the past year have you    Used an illegal drug or used a prescription medication for non-medical reasons? Never Filed at: 06/26/2021 1031                    MDM  Number of Diagnoses or Management Options  Acute bacterial bronchitis: new and does not require workup  Atypical chest pain: new and does not require workup  Hemoptysis: new and does not require workup  Diagnosis management comments: This 52-year-old male ambulated to the emergency department this morning reporting midsternal chest pain that has been intermittent for 3 days  Describes the pain as an ache  Denies radiation of the pain    Denies shortness of breath  Developed a cough this morning with hemoptysis  He states the chest pain has no aggravating or alleviating factors  Has not taken anything for the pain  Patient has no personal history of bleeding disorders  Patient does smoke a pack of cigarettes per day  On physical exam patient had decreased breath sounds bilaterally with expiratory wheezing  Otherwise physical exam was normal     Concern for bronchitis which is the most common cause of hemoptysis and patient of this age  This could be exacerbated by his smoking  Also concern for pneumonia, COVID, mass, ACS,pulmonary embolism, rhabdo, dehydration, electrolyte imbalance  Will order a cardiac profile  Being that the chest pain has been present for 3 days only 1 troponin will be necessary for ruling out acute coronary syndrome  Will do clotting times due to the hemoptysis  Will order chest x-ray initially  Will do a D-dimer if the D-dimer is elevated will order a CTA of the chest   If did D-dimer was normal will do a plain CT of the chest   Will medicate patient with 124 mg aspirin for cardiac profile  Will order an EKG  Patient will remain on a cardiac monitor and pulse oximetry  Oxygen will be applied of patient does have desaturations  No pain medication indicated as patient denies any pain  Will check patient for COVID as he has not had COVID and has not had the vaccination    Will administer Solu-Medrol due to the wheezing and chest tightness as well as a DuoNeb           Amount and/or Complexity of Data Reviewed  Clinical lab tests: ordered and reviewed  Tests in the radiology section of CPT®: ordered and reviewed  Independent visualization of images, tracings, or specimens: yes    Risk of Complications, Morbidity, and/or Mortality  Presenting problems: high  Diagnostic procedures: moderate  Management options: moderate    Patient Progress  Patient progress: improved      Disposition  Final diagnoses:   Hemoptysis   Acute bacterial bronchitis   Atypical chest pain     Time reflects when diagnosis was documented in both MDM as applicable and the Disposition within this note     Time User Action Codes Description Comment    6/26/2021  5:16 PM Leetta Lax Add [R04 2] Hemoptysis     6/26/2021  5:16 PM Leetta Lax Add [J20 8,  B96 89] Acute bacterial bronchitis     6/26/2021  5:17 PM Leetta Lax Add [R07 89] Atypical chest pain       ED Disposition     ED Disposition Condition Date/Time Comment    Discharge Stable Sat Jun 26, 2021  5:25 PM David Hays discharge to home/self care  Follow-up Information     Follow up With Specialties Details Why Contact Info    [facility]  Schedule an appointment as soon as possible for a visit in 3 days  [phone]            Discharge Medication List as of 6/26/2021  5:25 PM      START taking these medications    Details   albuterol (ProAir HFA) 90 mcg/act inhaler Inhale 2 puffs every 4 (four) hours as needed for wheezing, Starting Sat 6/26/2021, Normal      amoxicillin-clavulanate (AUGMENTIN) 875-125 mg per tablet Take 1 tablet by mouth every 12 (twelve) hours for 7 days, Starting Sat 6/26/2021, Until Sat 7/3/2021, Normal      predniSONE 20 mg tablet Take 2 tablets (40 mg total) by mouth daily for 5 days, Starting Sat 6/26/2021, Until Thu 7/1/2021, Normal           No discharge procedures on file      PDMP Review       Value Time User    PDMP Reviewed  Yes 6/26/2021  5:23 PM Berlin Mckeon MD          ED Provider  Electronically Signed by           Berlin Mckeon MD  06/26/21 8628

## 2021-06-26 NOTE — ED PROCEDURE NOTE
PROCEDURE  ECG 12 Lead Documentation Only    Date/Time: 6/26/2021 10:45 AM  Performed by: Valerie Pereira MD  Authorized by:  Valerie Pereira MD     Indications / Diagnosis:  Chest pain  ECG reviewed by me, the ED Provider: yes    Patient location:  ED and bedside  Previous ECG:     Previous ECG:  Unavailable    Comparison to cardiac monitor: Yes    Interpretation:     Interpretation: normal    Rate:     ECG rate:  88    ECG rate assessment: normal    Rhythm:     Rhythm: sinus rhythm    Ectopy:     Ectopy: none    QRS:     QRS axis:  Normal    QRS intervals:  Normal  Conduction:     Conduction: normal    ST segments:     ST segments:  Normal  T waves:     T waves: normal    Comments:      No acute ischemia or infarction         Valerie Pereira MD  06/26/21 1046

## 2022-09-22 ENCOUNTER — OFFICE VISIT (OUTPATIENT)
Dept: URGENT CARE | Facility: MEDICAL CENTER | Age: 32
End: 2022-09-22
Payer: COMMERCIAL

## 2022-09-22 VITALS
RESPIRATION RATE: 18 BRPM | HEIGHT: 73 IN | OXYGEN SATURATION: 98 % | TEMPERATURE: 98.1 F | HEART RATE: 86 BPM | BODY MASS INDEX: 36.31 KG/M2 | WEIGHT: 274 LBS

## 2022-09-22 DIAGNOSIS — J20.9 ACUTE BRONCHITIS, UNSPECIFIED ORGANISM: Primary | ICD-10-CM

## 2022-09-22 PROCEDURE — G0383 LEV 4 HOSP TYPE B ED VISIT: HCPCS | Performed by: PHYSICIAN ASSISTANT

## 2022-09-22 RX ORDER — ALBUTEROL SULFATE 90 UG/1
2 AEROSOL, METERED RESPIRATORY (INHALATION) EVERY 6 HOURS PRN
Qty: 8.5 G | Refills: 0 | Status: SHIPPED | OUTPATIENT
Start: 2022-09-22

## 2022-09-22 RX ORDER — BENZONATATE 200 MG/1
200 CAPSULE ORAL 3 TIMES DAILY PRN
Qty: 20 CAPSULE | Refills: 0 | Status: SHIPPED | OUTPATIENT
Start: 2022-09-22

## 2022-09-22 NOTE — PROGRESS NOTES
[facility]        NAME: Ulises Benavides is a 33 y o  male  : 1990    MRN: [id number]  DATE: 2022  TIME: 12:37 PM    Assessment and Plan   Acute bronchitis, unspecified organism [J20 9]  1  Acute bronchitis, unspecified organism  benzonatate (TESSALON) 200 MG capsule    albuterol (ProAir HFA) 90 mcg/act inhaler       Declined COVID test      Patient Instructions     Take Tessalon and albuterol inhaler as prescribed  Take over the counter Mucinex during the day  Fluids and rest (Warm water with honey and lemon)  Tylenol/Ibuprofen for pain feve  Follow up with PCP in 3-5 days  Proceed to  ER if symptoms worsen  Chief Complaint     Chief Complaint   Patient presents with    Cough     Started on  , had covid x 2 months ago          History of Present Illness       Patient is a smoker  Patient had COVID 2 months ago  Cough  This is a new problem  Episode onset:   The cough is productive of sputum  Associated symptoms include headaches, shortness of breath (after a coughing fit) and wheezing (baseline)  Pertinent negatives include no chills, ear pain, fever, rhinorrhea or sore throat  Treatments tried: cough drops  His past medical history is significant for asthma  Review of Systems   Review of Systems   Constitutional: Negative for chills and fever  HENT: Negative for congestion, ear pain, rhinorrhea, sinus pressure, sinus pain, sneezing and sore throat  Respiratory: Positive for cough, shortness of breath (after a coughing fit) and wheezing (baseline)  Negative for chest tightness  Gastrointestinal: Negative for nausea and vomiting  Neurological: Positive for headaches           Current Medications       Current Outpatient Medications:     albuterol (ProAir HFA) 90 mcg/act inhaler, Inhale 2 puffs every 6 (six) hours as needed for wheezing or shortness of breath, Disp: 8 5 g, Rfl: 0    benzonatate (TESSALON) 200 MG capsule, Take 1 capsule (200 mg total) by mouth 3 (three) times a day as needed for cough, Disp: 20 capsule, Rfl: 0    albuterol (ProAir HFA) 90 mcg/act inhaler, Inhale 2 puffs every 4 (four) hours as needed for wheezing (Patient not taking: Reported on 9/22/2022), Disp: 8 g, Rfl: 0    Current Allergies     Allergies as of 09/22/2022 - Reviewed 09/22/2022   Allergen Reaction Noted    Sulfa antibiotics  06/23/2020            The following portions of the patient's history were reviewed and updated as appropriate: allergies, current medications, past family history, past medical history, past social history, past surgical history and problem list      Past Medical History:   Diagnosis Date    Hypertension        Past Surgical History:   Procedure Laterality Date    HERNIA REPAIR         No family history on file  Medications have been verified  Objective   Pulse 86   Temp 98 1 °F (36 7 °C)   Resp 18   Ht 6' 1" (1 854 m)   Wt 124 kg (274 lb)   SpO2 98%   BMI 36 15 kg/m²   No LMP for male patient  Physical Exam     Physical Exam  Vitals reviewed  Constitutional:       General: He is not in acute distress  Appearance: He is well-developed  He is not diaphoretic  HENT:      Head: Normocephalic  Right Ear: Tympanic membrane and external ear normal       Left Ear: External ear normal       Nose: Nose normal       Mouth/Throat:      Pharynx: No oropharyngeal exudate or posterior oropharyngeal erythema  Eyes:      Conjunctiva/sclera: Conjunctivae normal    Cardiovascular:      Rate and Rhythm: Normal rate and regular rhythm  Heart sounds: Normal heart sounds  No murmur heard  No friction rub  No gallop  Pulmonary:      Effort: Pulmonary effort is normal  No respiratory distress  Breath sounds: Normal breath sounds  No wheezing or rales  Chest:      Chest wall: No tenderness  Lymphadenopathy:      Cervical: No cervical adenopathy  Skin:     General: Skin is warm     Neurological:      Mental Status: He is alert and oriented to person, place, and time  Psychiatric:         Behavior: Behavior normal          Thought Content:  Thought content normal          Judgment: Judgment normal

## 2022-09-22 NOTE — PATIENT INSTRUCTIONS
Take Tessalon and albuterol inhaler as prescribed  Take over the counter Mucinex during the day  Fluids and rest (Warm water with honey and lemon)  Tylenol/Ibuprofen for pain feve  Follow up with PCP in 3-5 days  Proceed to  ER if symptoms worsen

## 2022-10-12 PROBLEM — J20.8 ACUTE BACTERIAL BRONCHITIS: Status: RESOLVED | Noted: 2021-06-26 | Resolved: 2022-10-12

## 2022-10-12 PROBLEM — B96.89 ACUTE BACTERIAL BRONCHITIS: Status: RESOLVED | Noted: 2021-06-26 | Resolved: 2022-10-12
